# Patient Record
Sex: FEMALE | Race: WHITE | NOT HISPANIC OR LATINO | Employment: UNEMPLOYED | URBAN - METROPOLITAN AREA
[De-identification: names, ages, dates, MRNs, and addresses within clinical notes are randomized per-mention and may not be internally consistent; named-entity substitution may affect disease eponyms.]

---

## 2022-09-20 DIAGNOSIS — Z36.89 ENCOUNTER FOR FETAL ANATOMIC SURVEY: ICD-10-CM

## 2022-09-20 DIAGNOSIS — B19.20 HEPATITIS C VIRUS INFECTION WITHOUT HEPATIC COMA, UNSPECIFIED CHRONICITY: Primary | ICD-10-CM

## 2022-11-10 ENCOUNTER — PROCEDURE VISIT (OUTPATIENT)
Dept: MATERNAL FETAL MEDICINE | Facility: CLINIC | Age: 27
End: 2022-11-10
Attending: OBSTETRICS & GYNECOLOGY
Payer: COMMERCIAL

## 2022-11-10 VITALS — SYSTOLIC BLOOD PRESSURE: 120 MMHG | WEIGHT: 153 LBS | DIASTOLIC BLOOD PRESSURE: 60 MMHG

## 2022-11-10 DIAGNOSIS — Z98.891 HISTORY OF 2 CESAREAN SECTIONS: ICD-10-CM

## 2022-11-10 DIAGNOSIS — B19.20 HEPATITIS C VIRUS INFECTION WITHOUT HEPATIC COMA, UNSPECIFIED CHRONICITY: ICD-10-CM

## 2022-11-10 DIAGNOSIS — B18.9 CHRONIC VIRAL HEPATITIS COMPLICATING PREGNANCY IN SECOND TRIMESTER: Primary | ICD-10-CM

## 2022-11-10 DIAGNOSIS — O21.9 NAUSEA AND VOMITING IN PREGNANCY: ICD-10-CM

## 2022-11-10 DIAGNOSIS — Z36.89 ENCOUNTER FOR FETAL ANATOMIC SURVEY: ICD-10-CM

## 2022-11-10 DIAGNOSIS — O21.9 NAUSEA AND VOMITING IN PREGNANCY: Primary | ICD-10-CM

## 2022-11-10 DIAGNOSIS — O98.412 CHRONIC VIRAL HEPATITIS COMPLICATING PREGNANCY IN SECOND TRIMESTER: Primary | ICD-10-CM

## 2022-11-10 PROCEDURE — 3074F SYST BP LT 130 MM HG: CPT | Mod: CPTII,S$GLB,, | Performed by: OBSTETRICS & GYNECOLOGY

## 2022-11-10 PROCEDURE — 3078F DIAST BP <80 MM HG: CPT | Mod: CPTII,S$GLB,, | Performed by: OBSTETRICS & GYNECOLOGY

## 2022-11-10 PROCEDURE — 99204 PR OFFICE/OUTPT VISIT, NEW, LEVL IV, 45-59 MIN: ICD-10-PCS | Mod: 25,S$GLB,, | Performed by: OBSTETRICS & GYNECOLOGY

## 2022-11-10 PROCEDURE — 1159F PR MEDICATION LIST DOCUMENTED IN MEDICAL RECORD: ICD-10-PCS | Mod: CPTII,S$GLB,, | Performed by: OBSTETRICS & GYNECOLOGY

## 2022-11-10 PROCEDURE — 3074F PR MOST RECENT SYSTOLIC BLOOD PRESSURE < 130 MM HG: ICD-10-PCS | Mod: CPTII,S$GLB,, | Performed by: OBSTETRICS & GYNECOLOGY

## 2022-11-10 PROCEDURE — 3078F PR MOST RECENT DIASTOLIC BLOOD PRESSURE < 80 MM HG: ICD-10-PCS | Mod: CPTII,S$GLB,, | Performed by: OBSTETRICS & GYNECOLOGY

## 2022-11-10 PROCEDURE — 99999 PR PBB SHADOW E&M-EST. PATIENT-LVL II: ICD-10-PCS | Mod: PBBFAC,,, | Performed by: OBSTETRICS & GYNECOLOGY

## 2022-11-10 PROCEDURE — 76811 US MFM PROCEDURE (VIEWPOINT): ICD-10-PCS | Mod: S$GLB,,, | Performed by: OBSTETRICS & GYNECOLOGY

## 2022-11-10 PROCEDURE — 1159F MED LIST DOCD IN RCRD: CPT | Mod: CPTII,S$GLB,, | Performed by: OBSTETRICS & GYNECOLOGY

## 2022-11-10 PROCEDURE — 76811 OB US DETAILED SNGL FETUS: CPT | Mod: S$GLB,,, | Performed by: OBSTETRICS & GYNECOLOGY

## 2022-11-10 PROCEDURE — 99999 PR PBB SHADOW E&M-EST. PATIENT-LVL II: CPT | Mod: PBBFAC,,, | Performed by: OBSTETRICS & GYNECOLOGY

## 2022-11-10 PROCEDURE — 99204 OFFICE O/P NEW MOD 45 MIN: CPT | Mod: 25,S$GLB,, | Performed by: OBSTETRICS & GYNECOLOGY

## 2022-11-10 RX ORDER — DOXYLAMINE SUCCINATE AND PYRIDOXINE HYDROCHLORIDE, DELAYED RELEASE TABLETS 10 MG/10 MG 10; 10 MG/1; MG/1
2 TABLET, DELAYED RELEASE ORAL NIGHTLY
Qty: 60 TABLET | Refills: 5 | Status: SHIPPED | OUTPATIENT
Start: 2022-11-10 | End: 2023-10-11

## 2022-11-10 RX ORDER — DOXYLAMINE SUCCINATE AND PYRIDOXINE HYDROCHLORIDE, DELAYED RELEASE TABLETS 10 MG/10 MG 10; 10 MG/1; MG/1
2 TABLET, DELAYED RELEASE ORAL NIGHTLY
Qty: 60 TABLET | Refills: 5 | Status: CANCELLED | OUTPATIENT
Start: 2022-11-10

## 2022-11-10 NOTE — PROGRESS NOTES
MATERNAL-FETAL MEDICINE   CONSULT NOTE    Provider requesting consultation: Dr. Peter     SUBJECTIVE:     Ms. Verenice Mack is a 27 y.o.  female with IUP at 21.6 weeks who is seen in consultation by MFM for evaluation and management of:  Hepatitis C              Review of patient's allergies indicates:  Not on File    PMH:  Past Medical History:   Diagnosis Date    Anemia, unspecified     Drug addict     recovering    Unspecified viral hepatitis C without hepatic coma        PObHx:  OB History    Para Term  AB Living   5 2 2   2     SAB IAB Ectopic Multiple Live Births   2              # Outcome Date GA Lbr Fausto/2nd Weight Sex Delivery Anes PTL Lv   5 Current            4 Term      CS-Unspec      3 Term      CS-Unspec      2 SAB            1 SAB                PSH:  Past Surgical History:   Procedure Laterality Date     SECTION         Family history:family history is not on file.    Social history: History of heroin use.  Denies tobacco or alcohol use at today's visit.     Genetic history:  The patient denies any inherited genetic diseases or birth defects in herself or her partner's personal history or family.    Objective:   /60 (BP Location: Left arm, Patient Position: Sitting)   Wt 69.4 kg (153 lb)      Physical Exam: deferred     Ultrasound performed. See viewpoint for full ultrasound report.  A detailed fetal anatomic ultrasound examination was performed today due to the following high risk indication: Hep C Methadone use.    No fetal structural abnormalities are identified today, and fetal size is appropriate for EGA.   Cervical length by TA scanning is normal.   Placental location is anterior without evidence of previa.     Significant labs/imaging:  AST 75/   Hep C VL 2.2 million   Hep A IGM negative/Hep B IGM negative   HIV negative   Did not see RPR/GC/or chlamydia       ASSESSMENT/PLAN:     27 y.o.  female with IUP at 21.6 weeks     Hepatitis  C  Diagnosed age 24.    I counseled the patient regarding the risks of hepatitis C in pregnancy. In general, women chronically infected with hepatitis C have an uneventful pregnancy. In HIV-negative patients, the risk of vertical transmission is approximately 5%. The risk of vertical transmission increases in the presence of HIV co-infection. Pregnancy complications can include fetal growth restriction and low birthweight. Treatment with antiviral medications is not recommended during pregnancy.    Recommendations:  Screen for concurrent HIV infection, hepatitis A and hepatitis B infection, and other sexually transmitted diseases (syphilis, gonorrhea, and chlamydia).  Vaccinate for hepatitis A and hepatitis B if non-immune  Obtain baseline LFTs and hepatitis C viral load (to be ordered by primary OB provider)  Refer to hepatology for evaluation and consideration of treatment postpartum  Fetal growth ultrasound at 28 and 34 weeks  Mode of delivery per routine obstetric indications.  Avoid invasive fetal procedures intrapartum (early amniotomy, fetal scalp electrode, and episiotomy) unless necessary.  Breastfeeding is not contraindicated (recommend abstain from breastfeeding if nipples are bleeding or cracked).  Evaluation of  by pediatricians after delivery.    Substance abuse  Patient counseled regarding drug use in pregnancy. She has long history of heroin use, but reports no use in 8 months.  She is currently on methadone 120 mg daily.  She was on a suboxone protocol previously and felt that did not work for her. Medically supervised withdrawal is not currently recommended due to high relapse rates of up to 90% and poor outcomes.  Patient was counseled regarding poor  outcomes that occur in women who continue illicit substance use. We discussed the increased incidence of fetal growth restriction. We discussed the risk of  addiction and the need for  hospitalization for the  management of  abstinence syndrome.   abstinence syndrome is an expected and treatable condition seen in 30-80% of infants born to women taking opioid agonist therapies. In infants exposed to buprenorphine who develop CHUCK, symptoms appear anywhere between 12-48 hours, peak at 72-96 hours, and usually resolve by 7 days. Concomitant use of an SSRI may increase the incidence of CHUCK. There is no contraindication to breastfeeding while on opioid agonist therapy unless the patient is HIV positive or acutely relapses.     Recommendations for primary OB:  Continue opioid replacement therapy at a level sufficient to prevent acute maternal withdrawal.  This can be accomplished by the continued administration of methadone by a specialist qualified in the management of opioid addiction.  Serial fetal growth assessments, every 4-6 weeks, starting at 26-28 weeks  Full STD screening -needs GC/Chlam   Consider antepartum pediatric consultation/NICU tour  Antepartum testing is not indicated in absence of growth restriction  Anesthesia consultation in third trimester or upon admission to labor and delivery as management of pain can be challenging in women on chronic opioid therapy  Continue opioid replacement therapy during labor and postpartum period  Offer epidural/spinal as appropriate  Avoid Butorphanol, nalbuphine, and pentazocine as they can precipitate withdrawal  Close monitoring for peripartum mood disorders  Antepartum lactation consult is strongly suggested; breastfeeding is acceptable after 90 days of no evidence of illicit drug use and continued compliance with medically supervised opioid replacement. Breastfeeding is contraindicated in acute relapse/use of illicit substances.  Contraceptive counseling       FOLLOW UP:   Repeat US growth 28 and 34 weeks to assess fetal growth   Full STD screening is recommended (GC/chlam)   Additional recommendations as above.   Patient reports some nausea: diclegis sent  to pharmacy.     45 minutes of total time spent on the encounter, which includes face to face time and non-face to face time preparing to see the patient (eg, review of tests), obtaining and/or reviewing separately obtained history, documenting clinical information in the electronic or other health record, independently interpreting results (not separately reported) and communicating results to the patient/family/caregiver, or care coordination (not separately reported).      Brina Hogue  Maternal-Fetal Medicine    Electronically Signed by Brina Hogue November 10, 2022

## 2023-03-15 ENCOUNTER — OFFICE VISIT (OUTPATIENT)
Dept: OBSTETRICS AND GYNECOLOGY | Facility: CLINIC | Age: 28
End: 2023-03-15
Payer: COMMERCIAL

## 2023-03-15 VITALS — WEIGHT: 155 LBS | DIASTOLIC BLOOD PRESSURE: 82 MMHG | SYSTOLIC BLOOD PRESSURE: 130 MMHG

## 2023-03-15 PROCEDURE — 0503F PR POSTPARTUM CARE VISIT: ICD-10-PCS | Mod: CPTII,S$GLB,, | Performed by: OBSTETRICS & GYNECOLOGY

## 2023-03-15 PROCEDURE — 1159F PR MEDICATION LIST DOCUMENTED IN MEDICAL RECORD: ICD-10-PCS | Mod: CPTII,S$GLB,, | Performed by: OBSTETRICS & GYNECOLOGY

## 2023-03-15 PROCEDURE — 3075F PR MOST RECENT SYSTOLIC BLOOD PRESS GE 130-139MM HG: ICD-10-PCS | Mod: CPTII,S$GLB,, | Performed by: OBSTETRICS & GYNECOLOGY

## 2023-03-15 PROCEDURE — 3075F SYST BP GE 130 - 139MM HG: CPT | Mod: CPTII,S$GLB,, | Performed by: OBSTETRICS & GYNECOLOGY

## 2023-03-15 PROCEDURE — 3079F DIAST BP 80-89 MM HG: CPT | Mod: CPTII,S$GLB,, | Performed by: OBSTETRICS & GYNECOLOGY

## 2023-03-15 PROCEDURE — 1159F MED LIST DOCD IN RCRD: CPT | Mod: CPTII,S$GLB,, | Performed by: OBSTETRICS & GYNECOLOGY

## 2023-03-15 PROCEDURE — 0503F POSTPARTUM CARE VISIT: CPT | Mod: CPTII,S$GLB,, | Performed by: OBSTETRICS & GYNECOLOGY

## 2023-03-15 PROCEDURE — 3079F PR MOST RECENT DIASTOLIC BLOOD PRESSURE 80-89 MM HG: ICD-10-PCS | Mod: CPTII,S$GLB,, | Performed by: OBSTETRICS & GYNECOLOGY

## 2023-03-15 RX ORDER — IBUPROFEN 600 MG/1
600 TABLET ORAL EVERY 6 HOURS
COMMUNITY
Start: 2023-03-07

## 2023-03-15 RX ORDER — HYDROCODONE BITARTRATE AND ACETAMINOPHEN 5; 325 MG/1; MG/1
1 TABLET ORAL EVERY 6 HOURS PRN
Qty: 10 TABLET | Refills: 0 | Status: SHIPPED | OUTPATIENT
Start: 2023-03-15 | End: 2023-10-11

## 2023-03-15 NOTE — PROGRESS NOTES
"Chief Complaint   Patient presents with    Post-op Evaluation     Pt is here for a post op incision csection check visit        History of Present Illness:   Pateint presents today  10 days postop, status post , with complaint of cramping and pain on incisions. Pain is controlled with current analgesics. Medications being used: ibuprofen (OTC).    Pathology: {data reviewed:38228::"Discussion of test results with performing physician"}    Past medical and surgical history reviewed.   I have reviewed the patient's medical history in detail and updated the computerized patient record.    Physical exam:  /82   Wt 70.3 kg (155 lb)   Breastfeeding Yes   General:  Well-developed, well-nourished female in no acute distress  HEENT:  Normocephalic, atraumatic, extraocular muscles intact, moist mucous membranes  Breasts: deferred  Abdominal:  Soft, nontender, nondistended, incision clean dry and intact.   Extremities:  Warm, dry, no clubbing/cyanosis/edema  Neurological:  For cranial nerves 2-12 grossly intact   Dermatologic:  No rashes/lesions/bruising  Psychiatric:  Appropriate mood, affect, speech    Assessment:  Status post {gyn surgeries:51832}    Plan:  Doing well.  Routine postop care.  Cleared from a surgery standpoint.  Return with questions.      "

## 2023-03-15 NOTE — PROGRESS NOTES
CC: Post-partum follow-up    Verenice Mack is a 27 y.o. female  who presents for post-partum visit.  She is S/P  section.  Patient without complaints.  Pain controlled.  Tolerating p.o.  Pain controlled. Healing well. Positive ambulation. Positive flatus.  Bleeding is controlled.  No depression.  No abuse.  Patient presented to [facility] and was delivered by OB hospitalist on call as she was between providing doctors at the time.  She is here for follow-up.    Delivery Date: 3/4/2023   Delivery MD: Dr. Willams   Gender: female  Breast Feeding: breast  Depression: no  Contraception: abstinence  Facility: [facility]    Pregnancy was complicated by:  Abruption and emergency       Current Outpatient Medications:     ibuprofen (ADVIL,MOTRIN) 600 MG tablet, Take 600 mg by mouth every 6 (six) hours., Disp: , Rfl:     prenatal vit no.124/iron/folic (PRENATAL VITAMIN ORAL), Take by mouth., Disp: , Rfl:     cholecalciferol, vitamin D3, (VITAMIN D3 ORAL), Take by mouth., Disp: , Rfl:     doxylamine-pyridoxine, vit B6, (DICLEGIS) 10-10 mg TbEC, Take 2 tablets by mouth every evening. (Patient not taking: Reported on 3/15/2023), Disp: 60 tablet, Rfl: 5    methadone HCl (METHADONE ORAL), Take by mouth., Disp: , Rfl:      ROS:  GENERAL: No fever, chills, fatigability.  VULVAR: No pain, no lesions and no itching.  VAGINAL: No relaxation, no itching, no discharge, no abnormal bleeding and no lesions.  ABDOMEN: No abdominal pain. Denies nausea. Denies vomiting. No diarrhea. No constipation  BREAST: Denies pain. No lumps.  URINARY: No incontinence, no nocturia, no frequency and no dysuria.  CARDIOVASCULAR: No chest pain. No shortness of breath. No leg cramps.  NEUROLOGICAL: No headaches. No vision changes.      PHYSICAL EXAM:  /82   Wt 70.3 kg (155 lb)   Breastfeeding Yes    Exam chaperoned by nurse  General:  Well-developed, well-nourished female in no acute distress  HEENT:   Normocephalic, atraumatic, extraocular muscles intact  Breasts:  Nontender, no masses, bilaterally symmetric  Abdomen:  Soft, nontender, nondistended, fundus firm and below umbilicus, incision c/d/i  Extremities:  Warm, dry, no clubbing/cyanosis/edema  Neurologic:  Cranial nerves 2-12 grossly intact  Dermatologic:  No rashes/lesions/bruising    IMP:  Status post  section    PLAN:  Doing well.  Depression precautions discussed.  Method of contraception discussed and patient has decided.  Pregnancy spacing discussed.  Patient expressed understanding.  Return for annual exam.

## 2023-10-11 ENCOUNTER — TELEPHONE (OUTPATIENT)
Dept: HEPATOLOGY | Facility: CLINIC | Age: 28
End: 2023-10-11
Payer: COMMERCIAL

## 2023-10-11 ENCOUNTER — OFFICE VISIT (OUTPATIENT)
Dept: OBSTETRICS AND GYNECOLOGY | Facility: CLINIC | Age: 28
End: 2023-10-11
Payer: COMMERCIAL

## 2023-10-11 VITALS
HEART RATE: 72 BPM | WEIGHT: 182.63 LBS | DIASTOLIC BLOOD PRESSURE: 59 MMHG | HEIGHT: 60 IN | SYSTOLIC BLOOD PRESSURE: 104 MMHG | BODY MASS INDEX: 35.86 KG/M2

## 2023-10-11 DIAGNOSIS — F32.2 SEVERE MAJOR DEPRESSIVE DISORDER: Primary | ICD-10-CM

## 2023-10-11 DIAGNOSIS — B17.10 ACUTE HEPATITIS C VIRUS INFECTION WITHOUT HEPATIC COMA: ICD-10-CM

## 2023-10-11 DIAGNOSIS — F41.9 ANXIETY: ICD-10-CM

## 2023-10-11 PROCEDURE — 3074F PR MOST RECENT SYSTOLIC BLOOD PRESSURE < 130 MM HG: ICD-10-PCS | Mod: CPTII,S$GLB,,

## 2023-10-11 PROCEDURE — 3078F DIAST BP <80 MM HG: CPT | Mod: CPTII,S$GLB,,

## 2023-10-11 PROCEDURE — 3078F PR MOST RECENT DIASTOLIC BLOOD PRESSURE < 80 MM HG: ICD-10-PCS | Mod: CPTII,S$GLB,,

## 2023-10-11 PROCEDURE — 3008F BODY MASS INDEX DOCD: CPT | Mod: CPTII,S$GLB,,

## 2023-10-11 PROCEDURE — 99215 OFFICE O/P EST HI 40 MIN: CPT | Mod: S$GLB,,,

## 2023-10-11 PROCEDURE — 1159F MED LIST DOCD IN RCRD: CPT | Mod: CPTII,S$GLB,,

## 2023-10-11 PROCEDURE — 3008F PR BODY MASS INDEX (BMI) DOCUMENTED: ICD-10-PCS | Mod: CPTII,S$GLB,,

## 2023-10-11 PROCEDURE — 3074F SYST BP LT 130 MM HG: CPT | Mod: CPTII,S$GLB,,

## 2023-10-11 PROCEDURE — 1159F PR MEDICATION LIST DOCUMENTED IN MEDICAL RECORD: ICD-10-PCS | Mod: CPTII,S$GLB,,

## 2023-10-11 PROCEDURE — 99215 PR OFFICE/OUTPT VISIT, EST, LEVL V, 40-54 MIN: ICD-10-PCS | Mod: S$GLB,,,

## 2023-10-11 RX ORDER — FLUOXETINE HYDROCHLORIDE 40 MG/1
40 CAPSULE ORAL DAILY
Qty: 30 CAPSULE | Refills: 11 | Status: SHIPPED | OUTPATIENT
Start: 2023-10-11 | End: 2024-10-10

## 2023-10-11 RX ORDER — OLANZAPINE 5 MG/1
5 TABLET ORAL NIGHTLY
Qty: 30 TABLET | Refills: 11 | Status: SHIPPED | OUTPATIENT
Start: 2023-10-11 | End: 2024-10-10

## 2023-10-11 NOTE — TELEPHONE ENCOUNTER
Jo Ann De Jesus CNM sent over clinicals ordering that patient be scheduled for a hepatology consult visit for hep c.  Clinicals scanned into media.  [facility] which is not contracted with [facility] so I could not setup consult visit with PA Scheuermann.  This message is being sent to referring provider.

## 2023-10-11 NOTE — PROGRESS NOTES
HISTORY OF PRESENT ILLNESS:    Verenice Mack is a 28 y.o. female, , Patient's last menstrual period was 09/15/2023 (exact date).,  presents for a problem visit, complaining of worsening postpartum depression. Patient delivered in 2023. Hx of pre-existing depression. Was taking 40 mg of prozac prior to delivery. States she feels that depression was moderately controlled throughout the pregnancy. However after delivery her depression got significantly worse. Patient describes her symptoms as feeling very overwhelmed, inability to concentrate, heightened emotions, either lack of sleep or too much sleep. Patient denies thoughts of self harm or harm to others. Patient feels her new symptoms may be more like ADHD.     I reviewed with the patient that prior to her delivery, she had previously present depression. While there was some control with prozac there were some lingering symptoms. As she progressed into the postpartum periods her symptoms did progress into anxiety territory. I do not believe the patient to fall into the ADHD diagnosis at this time but more into severe depression with a postpartum anxiety component. Patient verbalizes understanding and does feel reassurance in having answers. At this time, I recommend keeping the patient on her current regimen of daily 40 mg Prozac and adding nightly 5 mg Zyprexa. If no improvement of the next 4 weeks, the patient should follow up in office. I also provided a brochure to mindful matters should the patient desire to try counseling.     Patient endorses diagnosis of Hep C, has not been treated yet. States she does not know who to go to for treatment. Referral to GLADIS Deleon at [facility] for Hep C treatment. Patient given contact info.     Past Medical History:   Diagnosis Date    Anemia, unspecified     Drug addict     recovering    Unspecified viral hepatitis C without hepatic coma        Past Surgical History:   Procedure Laterality Date      SECTION         MEDICATIONS AND ALLERGIES:      Current Outpatient Medications:     cholecalciferol, vitamin D3, (VITAMIN D3 ORAL), Take by mouth., Disp: , Rfl:     methadone HCl (METHADONE ORAL), Take by mouth., Disp: , Rfl:     FLUoxetine 40 MG capsule, Take 1 capsule (40 mg total) by mouth once daily., Disp: 30 capsule, Rfl: 11    ibuprofen (ADVIL,MOTRIN) 600 MG tablet, Take 600 mg by mouth every 6 (six) hours., Disp: , Rfl:     OLANZapine (ZYPREXA) 5 MG tablet, Take 1 tablet (5 mg total) by mouth nightly., Disp: 30 tablet, Rfl: 11    prenatal vit no.124/iron/folic (PRENATAL VITAMIN ORAL), Take by mouth., Disp: , Rfl:     Review of patient's allergies indicates:   Allergen Reactions    Penicillins        History reviewed. No pertinent family history.    Social History     Socioeconomic History    Marital status:    Tobacco Use    Smoking status: Every Day     Types: Vaping with nicotine   Substance and Sexual Activity    Drug use: Not Currently     Types: Heroin, Methamphetamines     Comment: hx of drug use - recovering addict    Sexual activity: Yes       ROS:  GENERAL: No weight changes. No swelling. No fatigue. No fever.  CARDIOVASCULAR: No chest pain. No shortness of breath. No leg cramps.   NEUROLOGICAL: No headaches. No vision changes.  BREASTS: No pain. No lumps. No discharge.  ABDOMEN: No pain. No nausea. No vomiting. No diarrhea. No constipation.  REPRODUCTIVE: No abnormal bleeding.   VULVA: No pain. No lesions. No itching.  VAGINA: No relaxation. No itching. No odor. No discharge. No lesions.  URINARY: No incontinence. No nocturia. No frequency. No dysuria.    BP (!) 104/59 (BP Location: Right arm, Patient Position: Sitting)   Pulse 72   Ht 5' (1.524 m)   Wt 82.8 kg (182 lb 9.6 oz)   LMP 09/15/2023 (Exact Date)   Breastfeeding No   BMI 35.66 kg/m²     PE:  APPEARANCE: Well nourished, well developed, in no acute distress.  ABDOMEN: Soft. No tenderness or masses. No hepatosplenomegaly. No  hernias.  BREASTS, FUNDOSCOPIC, RECTAL DEFERRED  PELVIC: External female genitalia without lesions.  Female hair distribution. Adequate perineal body, Normal urethral meatus. Vagina moist and well rugated without lesions or discharge.  No significant cystocele or rectocele present. Cervix pink without lesions, discharge or tenderness. Uterus is normal size, regular, mobile and nontender. Adnexa without masses or tenderness.  EXTREMITIES: No edema      DIAGNOSIS & PLAN  1. Severe major depressive disorder  FLUoxetine 40 MG capsule    OLANZapine (ZYPREXA) 5 MG tablet      2. Anxiety  FLUoxetine 40 MG capsule    OLANZapine (ZYPREXA) 5 MG tablet              COUNSELING:          I spent a total of 30 minutes on the day of the visit. addressing problems separate from annual exam.  This includes face to face time and non-face to face time preparing to see the patient (eg, review of tests), Obtaining and/or reviewing separately obtained history, Documenting clinical information in the electronic or other health record, Independently interpreting resultsand communicating results to the patient/family/caregiver, or Care coordination.

## 2023-10-20 DIAGNOSIS — B17.10 ACUTE HEPATITIS C VIRUS INFECTION WITHOUT HEPATIC COMA: Primary | ICD-10-CM

## 2023-10-30 ENCOUNTER — TELEPHONE (OUTPATIENT)
Dept: OBSTETRICS AND GYNECOLOGY | Facility: CLINIC | Age: 28
End: 2023-10-30
Payer: COMMERCIAL

## 2023-10-30 NOTE — TELEPHONE ENCOUNTER
Left detailed voice message concerning fax sent to [facility].  ----- Message from Contreras Wen sent at 10/19/2023 10:04 AM CDT -----  Regarding: Consult/Advisory  Contact: Verenice Mack  Consult/Advisory    Name Of Caller: Verenice Mack       Contact Preference: [phone] (home)      Nature of call: Patient calling to give contact and fax information for [facility] who will accept her insurance but needs the referral faxed to them. PH: [phone]   and Fax: [phone]